# Patient Record
Sex: FEMALE | Race: WHITE
[De-identification: names, ages, dates, MRNs, and addresses within clinical notes are randomized per-mention and may not be internally consistent; named-entity substitution may affect disease eponyms.]

---

## 2017-07-08 ENCOUNTER — HOSPITAL ENCOUNTER (OUTPATIENT)
Dept: HOSPITAL 10 - L-D | Age: 32
Discharge: HOME | End: 2017-07-08
Attending: OBSTETRICS & GYNECOLOGY
Payer: MEDICAID

## 2017-07-08 VITALS — DIASTOLIC BLOOD PRESSURE: 54 MMHG | HEART RATE: 94 BPM | SYSTOLIC BLOOD PRESSURE: 97 MMHG | RESPIRATION RATE: 20 BRPM

## 2017-07-08 VITALS
BODY MASS INDEX: 30.87 KG/M2 | HEIGHT: 55 IN | WEIGHT: 133.38 LBS | WEIGHT: 133.38 LBS | BODY MASS INDEX: 30.87 KG/M2 | HEIGHT: 55 IN

## 2017-07-08 DIAGNOSIS — Z3A.34: ICD-10-CM

## 2017-07-08 LAB
ADD SCAN DIFF: NO
BASOPHILS # BLD AUTO: 0 10^3/UL (ref 0–0.1)
BASOPHILS NFR BLD: 0.3 % (ref 0–2)
EOSINOPHIL # BLD: 0.2 10^3/UL (ref 0–0.5)
EOSINOPHIL NFR BLD: 2 % (ref 0–7)
ERYTHROCYTE [DISTWIDTH] IN BLOOD BY AUTOMATED COUNT: 12.7 % (ref 11.5–14.5)
HCT VFR BLD CALC: 32 % (ref 37–47)
HGB BLD-MCNC: 11.2 G/DL (ref 12–16)
LYMPHOCYTES # BLD AUTO: 2.4 10^3/UL (ref 0.8–2.9)
LYMPHOCYTES NFR BLD AUTO: 23.3 % (ref 15–51)
MCH RBC QN AUTO: 31.6 PG (ref 29–33)
MCHC RBC AUTO-ENTMCNC: 35 G/DL (ref 32–37)
MCV RBC AUTO: 90.4 FL (ref 82–101)
MONOCYTES # BLD: 0.6 10^3/UL (ref 0.3–0.9)
MONOCYTES NFR BLD: 6.1 % (ref 0–11)
NEUTROPHILS # BLD: 6.9 10^3/UL (ref 1.6–7.5)
NEUTROPHILS NFR BLD AUTO: 67.7 % (ref 39–77)
NRBC # BLD MANUAL: 0 10^3/UL (ref 0–0)
NRBC BLD QL: 0 /100WBC (ref 0–0)
PLATELET # BLD: 282 10^3/UL (ref 140–415)
PMV BLD AUTO: 10.6 FL (ref 7.4–10.4)
RBC # BLD AUTO: 3.54 10^6/UL (ref 4.2–5.4)
WBC # BLD AUTO: 10.2 10^3/UL (ref 4.8–10.8)

## 2017-07-08 PROCEDURE — G0463 HOSPITAL OUTPT CLINIC VISIT: HCPCS

## 2017-07-08 PROCEDURE — 76818 FETAL BIOPHYS PROFILE W/NST: CPT

## 2017-07-08 PROCEDURE — 86900 BLOOD TYPING SEROLOGIC ABO: CPT

## 2017-07-08 PROCEDURE — 85025 COMPLETE CBC W/AUTO DIFF WBC: CPT

## 2017-07-08 PROCEDURE — 86850 RBC ANTIBODY SCREEN: CPT

## 2017-07-08 PROCEDURE — 86901 BLOOD TYPING SEROLOGIC RH(D): CPT

## 2017-07-08 PROCEDURE — 85460 HEMOGLOBIN FETAL: CPT
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
## 2017-07-08 NOTE — HP
Date/Time of Note


Date/Time of Note


DATE: 17 


TIME: 16:35





OB - History


Hx of Present Pregnancy


Free Text/Dictation


34+


:  1


Para:  0


Prenatal Care:  Good Care


Ultrasounds:  Normal mid trimester US


Obstetrical Complications:  None


Medical Complications:  None





Past Family/Social History


*


Past Medical, Surgical, Family and Obstetric Histories reviewed from prenatal 

chart.





OB  Admission Exam


Vital Signs


Vital Signs





 Vital Signs








  Date Time  Temp Pulse Resp B/P Pulse Ox O2 Delivery O2 Flow Rate FiO2


 


17 15:48 98.6 94 20 97/54 97 Room Air  











Physical Exam


Abdomen:  WNL


Cervical Dilatation:  None


Effacement:  0%


Station:  Ballotable


Membranes:  Intact


Fetal Heart Rate:  140's


Accelerations:  Accelerations Present


Decelerations:  No Decelerations


Varibility:  Marked


Contractions on Admission:  >10 Minutes Apart





OB  Assessment/Plan


Reason for admission:  observation


Plan:  Expectant Management


Other plan:


24 hr observation


IV HydrationType and screen


KB ,CBC 


BPP placenta


Continuous monitoring











DANNI MEJIAS M.D. 2017 16:37

## 2017-08-05 ENCOUNTER — HOSPITAL ENCOUNTER (OUTPATIENT)
Dept: HOSPITAL 10 - OBT | Age: 32
Discharge: HOME | End: 2017-08-05
Attending: OBSTETRICS & GYNECOLOGY
Payer: MEDICAID

## 2017-08-05 VITALS — DIASTOLIC BLOOD PRESSURE: 63 MMHG | HEART RATE: 69 BPM | SYSTOLIC BLOOD PRESSURE: 100 MMHG | RESPIRATION RATE: 18 BRPM

## 2017-08-05 VITALS
BODY MASS INDEX: 28.51 KG/M2 | WEIGHT: 135.8 LBS | HEIGHT: 58 IN | HEIGHT: 58 IN | WEIGHT: 135.8 LBS | BODY MASS INDEX: 28.51 KG/M2

## 2017-08-05 DIAGNOSIS — O47.1: Primary | ICD-10-CM

## 2017-08-05 DIAGNOSIS — Z3A.38: ICD-10-CM

## 2017-08-05 PROCEDURE — G0463 HOSPITAL OUTPT CLINIC VISIT: HCPCS

## 2017-08-05 NOTE — CONS
Date/Time of Note


Date/Time of Note


DATE: 17 


TIME: 09:16





Consultation Date/Type/Reason


Admit Date/Time


2017


OB triage consult


Reason for Consultation


This patient is 32 years old  1 para 0 with estimated date of 

confinement of 2017 which makes her 38 weeks and 5 days.


She came to triage complaining of contractions since early in the morning..


On examination she is a well-developed well-nourished pregnant woman in her 

late pregnancy. 


 Her general vital signs are basically normal with blood pressure of 106/63, 

pulse rate 69, respiration 18 temperature 97.8,


On examination of the abdomen ,she does not have much of a contraction, fundus 

is soft ,fetus in vertex presentation 


.  Fetal heart tone is normal fetal heart tracing is reactive with good 

variability occasional acceleration no deceleration.


Constitutional:  No chills, No diaphoresis, No disoriented, No febrile, No 

improved, No no complaints, No other, No poor po, No requiring IVF, No 

requiring O2


Eyes:  No discharge, No no complaints, No other, No pain, No redness, No visual 

change


ENT:  No bleeding, No congestion, No discharge, No dysphagia, No no complaints, 

No other, No pain, No sore throat


Respiratory:  No cough, No no complaints, No other, No pain, No pleuritic pain, 

No shortness of breath, No sputum, No wheezing


Cardiovascular:  No chest pain, No edema, No lightheadedness, No no complaints, 

No orthopenea, No other, No palpitations, No paroxysmal nocturnal dyspnea


Gastrointestinal:  other (On pelvic examination there was no bleeding slight 

brown discharge the cervix was 2 cm 80% effaced head at -2 station membrane was 

intact), 


   No blood, No constipation, No decreased appetite, No diarrhea, No flatus, No 

nausea, No no complaints, No pain, No passing stool, No vomiting


Genitourinary:  No bleeding, No discharge, No dysuria, No flank pain, No 

hematuria, No no complaints, No other


Musculoskeletal:  No back pain, No bone/joint pain, No neck pain, No no 

complaints, No other, No restricted range of motion, No swelling


Skin:  No bruising, No erythema, No laceration, No no complaints, No other, No 

pruritis, No rash, No skin lesions


Neurologic:  No confusion, No dizziness, No focal-weakness, No headache, No no 

complaints, No other, No seizure, No syncope


Endocrine:  No dry skin, No no complaints, No other, No polydypsia, No polyuria

, No temp intolerance


Additional Comments


Patient was kept in the hospital for about 2 hours and pelvic exam was repeated 

after 2 hours with the same finding of 2 cm 80% and -2 station.  


Disposition: patient was informed that she is not in labor or at least not in 

active labor and she will be discharged home with instruction to rest at home 

and return in the delivery room in case of further contractions ,vaginal 

bleeding or rupture of membrane 


.  Otherwise she will attend her obstetrician's office for continuation of her 

care. 


 Final diagnosis of false labor





Social History


Smoking Status:  Never smoker





Exam/Review of Systems


Vital Signs


Vitals





 Vital Signs








  Date Time  Temp Pulse Resp B/P Pulse Ox O2 Delivery O2 Flow Rate FiO2


 


17 06:40 97.8 69 18 100/63  Room Air  

















FELICITY BOOTH MD Aug 5, 2017 09:25

## 2017-08-06 ENCOUNTER — HOSPITAL ENCOUNTER (INPATIENT)
Dept: HOSPITAL 10 - OBT | Age: 32
LOS: 2 days | Discharge: HOME | End: 2017-08-08
Attending: OBSTETRICS & GYNECOLOGY | Admitting: OBSTETRICS & GYNECOLOGY
Payer: MEDICAID

## 2017-08-06 VITALS — RESPIRATION RATE: 16 BRPM | DIASTOLIC BLOOD PRESSURE: 61 MMHG | SYSTOLIC BLOOD PRESSURE: 97 MMHG | HEART RATE: 86 BPM

## 2017-08-06 VITALS
HEIGHT: 56 IN | WEIGHT: 134.26 LBS | BODY MASS INDEX: 30.2 KG/M2 | WEIGHT: 134.26 LBS | HEIGHT: 56 IN | BODY MASS INDEX: 30.2 KG/M2

## 2017-08-06 VITALS — RESPIRATION RATE: 17 BRPM | DIASTOLIC BLOOD PRESSURE: 60 MMHG | SYSTOLIC BLOOD PRESSURE: 101 MMHG | HEART RATE: 91 BPM

## 2017-08-06 VITALS — RESPIRATION RATE: 16 BRPM | DIASTOLIC BLOOD PRESSURE: 63 MMHG | HEART RATE: 68 BPM | SYSTOLIC BLOOD PRESSURE: 95 MMHG

## 2017-08-06 VITALS — SYSTOLIC BLOOD PRESSURE: 114 MMHG | RESPIRATION RATE: 18 BRPM | DIASTOLIC BLOOD PRESSURE: 68 MMHG | HEART RATE: 78 BPM

## 2017-08-06 DIAGNOSIS — Z3A.38: ICD-10-CM

## 2017-08-06 LAB
ADD UMIC: YES
APTT BLD: 26.6 SEC (ref 25–35)
BACTERIA #/AREA URNS HPF: (no result) /HPF
BASOPHILS # BLD AUTO: 0 10^3/UL (ref 0–0.1)
BASOPHILS NFR BLD: 0.2 % (ref 0–2)
COLOR UR: YELLOW
EOSINOPHIL # BLD: 0.2 10^3/UL (ref 0–0.5)
EOSINOPHIL NFR BLD: 1 % (ref 0–7)
ERYTHROCYTE [DISTWIDTH] IN BLOOD BY AUTOMATED COUNT: 12.8 % (ref 11.5–14.5)
GLUCOSE UR STRIP-MCNC: NEGATIVE MG/DL
HCT VFR BLD CALC: 34.9 % (ref 37–47)
HGB BLD-MCNC: 11.7 G/DL (ref 12–16)
INR PPP: 0.86
KETONES UR STRIP.AUTO-MCNC: NEGATIVE MG/DL
LYMPHOCYTES # BLD AUTO: 2.3 10^3/UL (ref 0.8–2.9)
LYMPHOCYTES NFR BLD AUTO: 14.7 % (ref 15–51)
MCH RBC QN AUTO: 29.5 PG (ref 29–33)
MCHC RBC AUTO-ENTMCNC: 33.5 G/DL (ref 32–37)
MCV RBC AUTO: 87.9 FL (ref 82–101)
MONOCYTES # BLD: 0.9 10^3/UL (ref 0.3–0.9)
MONOCYTES NFR BLD: 5.8 % (ref 0–11)
NEUTROPHILS # BLD: 12.1 10^3/UL (ref 1.6–7.5)
NEUTROPHILS NFR BLD AUTO: 77.3 % (ref 39–77)
NITRITE UR QL STRIP.AUTO: NEGATIVE MG/DL
NRBC # BLD MANUAL: 0 10^3/UL (ref 0–0)
NRBC BLD AUTO-RTO: 0 /100WBC (ref 0–0)
PLATELET # BLD: 301 10^3/UL (ref 140–415)
PMV BLD AUTO: 12.2 FL (ref 7.4–10.4)
PROTHROMBIN TIME: 11.7 SEC (ref 12.2–14.2)
PT RATIO: 0.9
RBC # BLD AUTO: 3.97 10^6/UL (ref 4.2–5.4)
RBC # UR AUTO: (no result) MG/DL
SQUAMOUS #/AREA URNS HPF: (no result) /HPF
UR ASCORBIC ACID: NEGATIVE MG/DL
UR BILIRUBIN (DIP): NEGATIVE MG/DL
UR CLARITY: CLEAR
UR PH (DIP): 6 (ref 5–9)
UR RBC: 3 /HPF (ref 0–5)
UR SPECIFIC GRAVITY (DIP): 1.02 (ref 1–1.03)
UR TOTAL PROTEIN (DIP): NEGATIVE MG/DL
UROBILINOGEN UR STRIP-ACNC: NEGATIVE MG/DL
WBC # BLD AUTO: 15.6 10^3/UL (ref 4.8–10.8)
WBC # UR STRIP: (no result) LEU/UL

## 2017-08-06 PROCEDURE — 86901 BLOOD TYPING SEROLOGIC RH(D): CPT

## 2017-08-06 PROCEDURE — 86900 BLOOD TYPING SEROLOGIC ABO: CPT

## 2017-08-06 PROCEDURE — 85025 COMPLETE CBC W/AUTO DIFF WBC: CPT

## 2017-08-06 PROCEDURE — 85730 THROMBOPLASTIN TIME PARTIAL: CPT

## 2017-08-06 PROCEDURE — 85610 PROTHROMBIN TIME: CPT

## 2017-08-06 PROCEDURE — 86592 SYPHILIS TEST NON-TREP QUAL: CPT

## 2017-08-06 PROCEDURE — G0463 HOSPITAL OUTPT CLINIC VISIT: HCPCS

## 2017-08-06 PROCEDURE — 4A1HX4Z MONITORING OF PRODUCTS OF CONCEPTION, CARDIAC ELECTRICAL ACTIVITY, EXTERNAL APPROACH: ICD-10-PCS | Performed by: OBSTETRICS & GYNECOLOGY

## 2017-08-06 PROCEDURE — 81001 URINALYSIS AUTO W/SCOPE: CPT

## 2017-08-06 PROCEDURE — 0HQ9XZZ REPAIR PERINEUM SKIN, EXTERNAL APPROACH: ICD-10-PCS | Performed by: OBSTETRICS & GYNECOLOGY

## 2017-08-06 RX ADMIN — Medication SCH MLS/HR: at 14:05

## 2017-08-06 RX ADMIN — IBUPROFEN SCH MG: 600 TABLET ORAL at 17:28

## 2017-08-06 RX ADMIN — DOCUSATE SODIUM AND SENNOSIDES SCH TAB: 8.6; 5 TABLET, FILM COATED ORAL at 21:09

## 2017-08-06 RX ADMIN — Medication SCH MLS/HR: at 18:03

## 2017-08-06 NOTE — LDN
Date/Time of Note


Date/Time of Note


DATE: 8/6/17 


TIME: 12:36





Delivery Summary


Normal spontaneous vaginal delivery of a baby boy from OA position shoulders 

delivered without any difficulty rest of the baby's body follow placenta is 

spontaneous expulsion inspected complete patient sustained 1 cm vaginal 

laceration repaired with 4-0 chromic catgut estimated blood loss 250 mL


Weeks of Gestation


38 weeks and 6 days


Placenta Delivered:  Spontaneously


Meconium:  none


Episiotomy:  No


Laceration repair:  


Small first-degree


perineal laceration 1 cm


repaired with 4-0


chromic catgut


Anesthesia type:  Local


Estimated blood loss:  25


Sponge & Needle done & correct:  Yes


All needle counts correct:  Yes


Any foreign bodies felt in the:  No


Problems:  





Infant Delivery Information


Sex


Infant Sex:  male





Apgars


1 Minute:  7


5 Minute:  9





Suctioning


Nose & mouth suctioned at kenzie:  Yes


Delee suction performed:  No





Umbilical Cord


Umbilical cord with:  3 Vessels


Cord presentations:  nuchal cord


Nuchal cord present X:  1


Cord Blood was obtained:  Yes











WHITLEY DORSEY MD Aug 6, 2017 12:40

## 2017-08-06 NOTE — TRIAGE
===================================

OB Triage

===================================

Datetime Report Generated by CPN: 08/06/2017 03:43

   

   

===========================

Datetime: 08/06/2017 03:28

===========================

   

   

===================================

Pain Assessment

===================================

   

 Pain Scale:  10

 Pain Presence:  Intermittent

 Pain Type:  Contraction

 Pain Location:  Abdomen; Back

 Pain Goal:  2

 Pain Relief Measures:  Comfort Measures

   

===========================

Datetime: 08/06/2017 02:55

===========================

   

 Assessment Type:  Admission Assessment

 Vaginal Bleeding:  Normal Show

   

===================================

Maternal Assessment

===================================

   

 Level of Consciousness:  Fully Conscious

 DTR's/Clonus:  DTRs 2+; No Clonus

 Headache:  Denies

 Blurred Vision:  No

 Respiratory Effort:  Unlabored; Regular Rhythm; Equal Expansion

 Breath Sounds, Left:  Clear and Equal

 Breath Sounds, Right:  Clear and Equal

 Nausea/Vomiting:  Denies

 RUQ Epigastric Pain:  Denies

 Lower Extremities Edema:  None

     Degree:  None

 Upper Extremities Edema:  None

     Degree:  None

 Facial Edema:  None

   

===================================

Fall Risk Assessment

===================================

   

 History of Falling:  (0) No

 Secondary Diagnosis:  (0) No

 Ambulatory Aid:  (0) Bedrest/Nurse Assist

 IV Therapy:  (0) No

 Gait:  (0) Normal/Bedrest/Immobile

 Mental Status:  (0) Oriented to Own Ability

 Fall Score:  0

 Fall Risk Score Definition:  No Risk: No action required

   

===================================

Pain Assessment

===================================

   

 Pain Scale:  10

 Pain Presence:  Intermittent

 Pain Type:  Contraction

 Pain Location:  Abdomen; Back

 Pain Goal:  2

 Membrane Status:  Bulging

   

===========================

Datetime: 08/06/2017 02:20

===========================

   

 Stage of Pregnancy:  OB Triage

   

===================================

Labor Evaluation

===================================

   

 Frequency:  2-3

 Monitor Mode:  External

 Duration (sec)2399:  

 Quality:  Moderate

 Pattern:  Normal: <= 5 Contractions in 10 Minutes

   

===================================

Fetal Heart Rate

===================================

   

 FHR Baseline Rate:  140

 Monitor Mode:  External US

 FHR Baseline Changes:  No Baseline Change

 Variability:  Moderate 6-25 bpm

 Accelerations:  15X15

 Decelerations:  None

 Category:  Category I

   

===========================

Datetime: 08/06/2017 02:15

===========================

   

 Stage of Pregnancy:  OB Triage

   

===========================

Datetime: 08/06/2017 01:55

===========================

   

   

===================================

Vaginal Exam

===================================

   

 Dilatation (cms):  2.0

 Effacement (%):  100

 Station:  -2

 Exam By:  HANNA Larsen RN

 Vaginal Bleeding:  Normal Show

 Cervix, Consistency:  Soft

 Cervix, Position:  Anterior

   

===========================

Datetime: 08/06/2017 01:51

===========================

   

 Assessment Type:  Triage

   

===================================

Maternal Assessment

===================================

   

 Level of Consciousness:  Fully Conscious

 DTR's/Clonus:  DTRs 2+; No Clonus

 Headache:  Denies

 Blurred Vision:  No

 Respiratory Effort:  Unlabored; Regular Rhythm; Equal Expansion

 Breath Sounds, Left:  Clear and Equal

 Breath Sounds, Right:  Clear and Equal

 Nausea/Vomiting:  Denies

 RUQ Epigastric Pain:  Denies

 Lower Extremities Edema:  None

     Degree:  None

 Upper Extremities Edema:  None

     Degree:  None

 Facial Edema:  None

   

===================================

Fall Risk Assessment

===================================

   

 History of Falling:  (0) No

 Secondary Diagnosis:  (0) No

 Ambulatory Aid:  (0) Bedrest/Nurse Assist

 IV Therapy:  (0) No

 Gait:  (0) Normal/Bedrest/Immobile

 Mental Status:  (0) Oriented to Own Ability

 Fall Score:  0

 Fall Risk Score Definition:  No Risk: No action required

   

===================================

Pain Assessment

===================================

   

 Pain Scale:  9

 Pain Presence:  Intermittent

 Pain Type:  Cramping; Contraction

 Pain Location:  Abdomen

 Pain Goal:  3

 Pain Relief Measures:  Comfort Measures

 Pain Assessment Comments:  HANNA Larsen RN

   

===========================

Datetime: 08/06/2017 01:38

===========================

   

 Time of Arrival:  08/06/2017 01:23

 EGA:  38.6

 Arrived By:  Wheelchair

 Arrived From:  Home

 Chief Complaint:  UC's X2 days (Annotations: Data stored by N on behalf of user)

 Fetal Movement:  Present

 Contractions:  Regular

 Time Contractions Began:  08/04/2017 09:00

 Patient Complaints:  Contractions

 Time Provider Notified:  08/06/2017 02:15

 Provider Notified:  Dr Brady

 Initial Plan:  EFM X2, SVE

   

===========================

Datetime: 08/05/2017 09:06

===========================

   

   

===================================

Labor Evaluation

===================================

   

 Frequency:  2-5

 Monitor Mode:  External

 Duration (sec)2399:  

 Quality:  Mild

 Pattern:  Normal: <= 5 Contractions in 10 Minutes

 Resting Tone [place]:  Relaxed

   

===================================

Fetal Heart Rate

===================================

   

 FHR Baseline Rate:  135

 Monitor Mode:  External US

 Variability:  Moderate 6-25 bpm

 Accelerations:  15X15

 Decelerations:  None

 Category:  Category I

   

===========================

Datetime: 08/05/2017 09:04

===========================

   

   

===================================

Vaginal Exam

===================================

   

 Dilatation (cms):  2.0

 Effacement (%):  90

 Station:  -3

 Exam By:  ogbodu rn 

   

===========================

Datetime: 08/05/2017 08:40

===========================

   

   

===================================

Maternal Assessment

===================================

   

 Level of Consciousness:  Fully Conscious

 DTR's/Clonus:  DTRs 2+; No Clonus

 Headache:  Denies

 Blurred Vision:  No

 Respiratory Effort:  Unlabored; Regular Rhythm; Equal Expansion

 Breath Sounds, Left:  Clear and Equal

 Breath Sounds, Right:  Clear and Equal

 Nausea/Vomiting:  Denies

 RUQ Epigastric Pain:  Denies

 Lower Extremities Edema:  Bilateral Lower Extremities

     Degree:  None

 Upper Extremities Edema:  None

     Degree:  None

 Facial Edema:  None

   

===================================

Fall Risk Assessment

===================================

   

 History of Falling:  (0) No

 Secondary Diagnosis:  (0) No

 Ambulatory Aid:  (0) Bedrest/Nurse Assist

 IV Therapy:  (0) No

 Gait:  (0) Normal/Bedrest/Immobile

 Mental Status:  (0) Oriented to Own Ability

 Fall Score:  0

 Fall Risk Score Definition:  No Risk: No action required

   

===========================

Datetime: 08/05/2017 08:04

===========================

   

   

===================================

Labor Evaluation

===================================

   

 Frequency:  3-5

 Monitor Mode:  External

 Duration (sec)2399:  

 Quality:  Moderate

 Pattern:  Normal: <= 5 Contractions in 10 Minutes

 Resting Tone [place]:  Relaxed

   

===================================

Fetal Heart Rate

===================================

   

 FHR Baseline Rate:  145

 Monitor Mode:  External US

 Variability:  Moderate 6-25 bpm

 Accelerations:  15X15

 Decelerations:  None

 Category:  Category I

 Comments:  nst reactive for gestational age 

   

===========================

Datetime: 08/05/2017 07:00

===========================

   

   

===================================

Labor Evaluation

===================================

   

 Frequency:  2-6

 Monitor Mode:  External

 Duration (sec)2399:  

 Quality:  Mild

 Pattern:  Normal: <= 5 Contractions in 10 Minutes

 Resting Tone [place]:  Relaxed

   

===================================

Fetal Heart Rate

===================================

   

 FHR Baseline Rate:  135

 Monitor Mode:  External US

 Variability:  Moderate 6-25 bpm

 Accelerations:  15X15

 Decelerations:  None

 Category:  Category I

   

===========================

Datetime: 08/05/2017 06:53

===========================

   

   

===================================

Vaginal Exam

===================================

   

 Dilatation (cms):  2.0

 Effacement (%):  80

 Station:  -2

 Exam By:  BE

 Membrane Status:  Bulging

 Vaginal Bleeding:  None

 Cervix, Consistency:  Soft

 Cervix, Position:  Midposition

 Fetal Presentation 'A':  Cephalic

   

===========================

Datetime: 08/05/2017 06:37

===========================

   

 Time of Arrival:  08/05/2017 06:15

 EGA:  38.5

 Arrived By:  Ambulatory

 Arrived From:  Home

 Chief Complaint:  CONTRACTIONS 

 Fetal Movement:  Present

 Contractions:  Regular

 Time Contractions Began:  08/05/2017 00:00

 Contractions:  Q5MIN

 Rupture of Membranes:  Denies

 Vaginal Bleeding:  None

 Vaginal Discharge:  Denies

 Recent Sexual Intercouse:  Denies

 Abdominal Trauma:  Not Applicable

 Patient Complaints:  Contractions

 Time Provider Notified:  08/05/2017 07:05

 Provider Notified:  JHON

 Initial Plan:  CEFM, SVE

   

===========================

Datetime: 08/05/2017 06:36

===========================

   

 Stage of Pregnancy:  OB Triage

 Temperature Route:  Oral

   

===================================

Pain Assessment

===================================

   

 Pain Scale:  6

 Pain Presence:  Intermittent

 Pain Type:  Cramping; Contraction

 Pain Location:  Abdomen

   

===========================

Datetime: 08/05/2017 06:34

===========================

   

 Assessment Type:  Triage

   

===================================

Maternal Assessment

===================================

   

 Level of Consciousness:  Fully Conscious

 DTR's/Clonus:  DTRs 2+; No Clonus

 Headache:  Denies

 Blurred Vision:  No

 Respiratory Effort:  Unlabored; Regular Rhythm; Equal Expansion

 Breath Sounds, Left:  Clear and Equal

 Breath Sounds, Right:  Clear and Equal

 Nausea/Vomiting:  Denies

 RUQ Epigastric Pain:  Denies

 Lower Extremities Edema:  None

     Degree:  None

 Upper Extremities Edema:  None

     Degree:  None

 Facial Edema:  None

   

===================================

Fall Risk Assessment

===================================

   

 History of Falling:  (0) No

 Secondary Diagnosis:  (0) No

 Ambulatory Aid:  (0) Bedrest/Nurse Assist

 IV Therapy:  (0) No

 Gait:  (0) Normal/Bedrest/Immobile

 Mental Status:  (0) Oriented to Own Ability

 Fall Score:  0

 Fall Risk Score Definition:  No Risk: No action required

   

===========================

Datetime: 07/08/2017 16:26

===========================

   

 EGA:  34.5

## 2017-08-06 NOTE — HP
Date/Time of Note


Date/Time of Note


DATE: 17 


TIME: 02:37





OB - History


Hx of Present Pregnancy


Free Text/Dictation


32y.o primigravida at 38w6d came to triage with  c/o uterine contractions for  

2days with intact membrane.


VE 2cm 100% -2 with bulging  bag


 pain level  is 9/10 


EFM shows U.C 2-4min  with reactive strips


prenatal record is not available.


admitted for expectant management,


Chief Complaint:  U.C


Estimated Due Date:  Aug 5, 2017


:  2


Para:  1


Spontaneous :  0


Therapeutic :  0


Prenatal Care:  Other


Ultrasounds:  Other


Obstetrical Complications:  None


Medical Complications:  None





Past Family/Social History


*


Past Medical, Surgical, Family and Obstetric Histories reviewed from prenatal 

chart.


Blood Type:  Unknown


Rubella:  unknown


RPR/VDRL:  Unknown


GBS Status:  Unknown


HBsAG:  Unknown





OB  Admission Exam


Vital Signs


Vital Signs





 Vital Signs








  Date Time  Temp Pulse Resp B/P Pulse Ox O2 Delivery O2 Flow Rate FiO2


 


17 02:05 98.6 78 18 114/68  Room Air  











Physical Exam


HEENT:  WNL


Heart:  Rhythm Normal


Lungs:  Clear, Equal


Abdomen:  WNL


Extremities:  Normal


Reflexes:  Normal


Cervical Dilatation:  2cm


Effacement:  75%


Station:  -3


Membranes:  Intact


Amniotic Fluid:  Unevaluable


Fetal Heart Rate:  140's


Accelerations:  Accelerations Present


Decelerations:  No Decelerations


Varibility:  Moderate


Contractions on Admission:  < 5 Minutes Apart


Intensity:  Mild





OB  Assessment/Plan


Other Assessment:


IUP 38w6d in labor


Plan:  Expectant Management











MORENITA FERREIRA MD Aug 6, 2017 02:45

## 2017-08-07 VITALS — HEART RATE: 79 BPM | DIASTOLIC BLOOD PRESSURE: 55 MMHG | SYSTOLIC BLOOD PRESSURE: 96 MMHG | RESPIRATION RATE: 18 BRPM

## 2017-08-07 VITALS — DIASTOLIC BLOOD PRESSURE: 58 MMHG | HEART RATE: 74 BPM | RESPIRATION RATE: 17 BRPM | SYSTOLIC BLOOD PRESSURE: 105 MMHG

## 2017-08-07 VITALS — RESPIRATION RATE: 17 BRPM | HEART RATE: 64 BPM | DIASTOLIC BLOOD PRESSURE: 64 MMHG | SYSTOLIC BLOOD PRESSURE: 102 MMHG

## 2017-08-07 VITALS — SYSTOLIC BLOOD PRESSURE: 97 MMHG | RESPIRATION RATE: 18 BRPM | DIASTOLIC BLOOD PRESSURE: 53 MMHG | HEART RATE: 98 BPM

## 2017-08-07 VITALS — DIASTOLIC BLOOD PRESSURE: 50 MMHG | HEART RATE: 86 BPM | RESPIRATION RATE: 18 BRPM | SYSTOLIC BLOOD PRESSURE: 106 MMHG

## 2017-08-07 LAB
BASOPHILS # BLD AUTO: 0 10^3/UL (ref 0–0.1)
BASOPHILS NFR BLD: 0.3 % (ref 0–2)
EOSINOPHIL # BLD: 0.2 10^3/UL (ref 0–0.5)
EOSINOPHIL NFR BLD: 1.4 % (ref 0–7)
ERYTHROCYTE [DISTWIDTH] IN BLOOD BY AUTOMATED COUNT: 13.1 % (ref 11.5–14.5)
HCT VFR BLD CALC: 29.2 % (ref 37–47)
HGB BLD-MCNC: 9.6 G/DL (ref 12–16)
LYMPHOCYTES # BLD AUTO: 2.7 10^3/UL (ref 0.8–2.9)
LYMPHOCYTES NFR BLD AUTO: 19.6 % (ref 15–51)
MCH RBC QN AUTO: 29.5 PG (ref 29–33)
MCHC RBC AUTO-ENTMCNC: 32.9 G/DL (ref 32–37)
MCV RBC AUTO: 89.8 FL (ref 82–101)
MONOCYTES # BLD: 0.9 10^3/UL (ref 0.3–0.9)
MONOCYTES NFR BLD: 6.8 % (ref 0–11)
NEUTROPHILS # BLD: 9.9 10^3/UL (ref 1.6–7.5)
NEUTROPHILS NFR BLD AUTO: 71.2 % (ref 39–77)
NRBC # BLD MANUAL: 0 10^3/UL (ref 0–0)
NRBC BLD AUTO-RTO: 0 /100WBC (ref 0–0)
PLATELET # BLD: 247 10^3/UL (ref 140–415)
PMV BLD AUTO: 11.2 FL (ref 7.4–10.4)
RBC # BLD AUTO: 3.25 10^6/UL (ref 4.2–5.4)
WBC # BLD AUTO: 13.9 10^3/UL (ref 4.8–10.8)

## 2017-08-07 RX ADMIN — DOCUSATE SODIUM AND SENNOSIDES SCH TAB: 8.6; 5 TABLET, FILM COATED ORAL at 21:00

## 2017-08-07 RX ADMIN — IBUPROFEN SCH MG: 600 TABLET ORAL at 00:00

## 2017-08-07 RX ADMIN — IBUPROFEN SCH MG: 600 TABLET ORAL at 05:49

## 2017-08-07 RX ADMIN — DOCUSATE SODIUM AND SENNOSIDES SCH TAB: 8.6; 5 TABLET, FILM COATED ORAL at 09:00

## 2017-08-07 RX ADMIN — IBUPROFEN SCH MG: 600 TABLET ORAL at 12:00

## 2017-08-07 RX ADMIN — IBUPROFEN SCH MG: 600 TABLET ORAL at 18:00

## 2017-08-07 NOTE — PN
Date/Time of Note


Date/Time of Note


DATE: 8/7/17 


TIME: 08:49





OB Subjective


Subjective


Subjective





 Laboratory Tests








Test


  8/7/17


07:04


 


White Blood Count 13.910^3/ul 


 


Red Blood Count 3.2510^6/ul 


 


Hemoglobin 9.6g/dl 


 


Hematocrit 29.2% 


 


Mean Corpuscular Volume 89.8fl 


 


Mean Corpuscular Hemoglobin 29.5pg 


 


Mean Corpuscular Hemoglobin


Concent 32.9g/dl 


 


 


Red Cell Distribution Width 13.1% 


 


Platelet Count 33061^3/UL 


 


Mean Platelet Volume 11.2fl 


 


Neutrophils % 71.2% 


 


Lymphocytes % 19.6% 


 


Monocytes % 6.8% 


 


Eosinophils % 1.4% 


 


Basophils % 0.3% 


 


Nucleated Red Blood Cells % 0.0/100WBC 


 


Neutrophils # 9.910^3/ul 


 


Lymphocytes # 2.710^3/ul 


 


Monocytes # 0.910^3/ul 


 


Eosinophils # 0.210^3/ul 


 


Basophils # 0.010^3/ul 


 


Nucleated Red Blood Cells # 0.010^3/ul 








 Current Medications








 Medications


  (Trade)  Dose


 Ordered  Sig/Stella


 Route


 PRN Reason  Start Time


 Stop Time Status Last Admin


Dose Admin


 


 Lactated Ringer's  1,000 ml @ 


 125 mls/hr  Q8H


 IV


   8/6/17 02:27


 8/6/17 14:07 DC 8/6/17 02:48


 


 


 Ampicillin  100 ml @ 


 100 mls/hr  ONCE  ONCE


 IV


   8/6/17 02:30


 8/6/17 03:29 DC 8/6/17 03:25


 


 


 Ampicillin


  (Ampicillin 1 Gm/


 NS (Pmx))  50 ml @ 


 100 mls/hr  Q4H


 IV


   8/6/17 06:30


 8/6/17 14:07 DC 8/6/17 07:43


 


 


 Butorphanol


 Tartrate


  (Stadol)  2 mg  Q2H  PRN


 IV


 PAIN  8/6/17 02:30


 8/6/17 14:08 DC 8/6/17 07:59


 


 


 Lidocaine 30 ml  30 ml  ONCE PRN


 INJ


 EPISIOTOMY/TEARING  8/6/17 02:30


 8/6/17 14:08 DC  


 


 


 Oxytocin/Lactated


 Ringer's  500 ml @ 


 125 mls/hr  ONCE -MAY REPEAT X1


 IV


   8/6/17 02:30


 8/6/17 14:08 DC  


 


 


 Oxytocin/Lactated


 Ringer's  500 ml @ 


 125 mls/hr  ONCE


 IV


   8/6/17 02:30


 8/6/17 14:08 DC 8/6/17 11:42


 


 


 Ibuprofen


  (Motrin)  600 mg  ONCE PRN


 PO


 Mild Pain (Pain Score 1-3)  8/6/17 02:30


 8/6/17 14:08 DC  


 


 


 Acetaminophen/


 Codeine Phosphate


 2 tab  2 tab  ONCE PRN


 PO


 Moderate to Severe Pain (4-10)  8/6/17 02:30


 8/6/17 14:08 DC  


 


 


 Lactated Ringer's  1,000 ml @ 


 2,000 mls/hr  Q30M PRN


 IV


 PRE-EPIDURAL BOLUS  8/6/17 03:00


 8/6/17 14:08 DC  


 


 


 Oxytocin/Lactated


 Ringer's  500 ml @ 0


 mls/hr  ONCE PRN


 IV


 For Hemorrhage Management  8/6/17 02:30


 8/6/17 14:08 DC  


 


 


 Methylergonovine


 Maleate


  (Methergine)  0.2 mg  ONCE PRN


 IM


 VAGINAL BLEEDING  8/6/17 02:30


 8/6/17 14:08 DC  


 


 


 Carboprost


 Tromethamine


  (Hemabate)  250 mcg  ONCE PRN


 IM


 VAGINAL BLEEDING  8/6/17 02:30


 8/6/17 14:08 DC  


 


 


 Misoprostol 1000


 mcg  1,000 mcg  ONCE PRN


 PA


 VAGINAL BLEEDING  8/6/17 02:30


 8/6/17 14:08 DC  


 


 


 Oxytocin/Lactated


 Ringer's  500 ml @ 0


 mls/hr  Q0M


 IV


   8/6/17 08:30


 8/6/17 14:08 DC 8/6/17 08:34


 


 


 Mineral Oil     ONCE  ONCE


 TOP


   8/6/17 11:00


 8/6/17 11:01 DC  


 


 


 Oxytocin/Lactated


 Ringer's  500 ml @ 


 125 mls/hr  Q4H


 IV


   8/6/17 14:05


 8/6/17 22:04 DC  


 


 


 Ibuprofen


  (Motrin)  600 mg  Q6


 PO


   8/6/17 18:00


    8/6/17 17:28


 


 


 Acetaminophen


  (Tylenol Tab)  650 mg  Q4H  PRN


 PO


 PAIN LEVEL 1-5  8/6/17 14:30


     


 


 


 Acetaminophen/


 Codeine Phosphate


  (Tylenol No.3)  1 tab  Q4H  PRN


 PO


 PAIN LEVEL 1-5  8/6/17 14:30


     


 


 


 Acetaminophen/


 Codeine Phosphate


  (Tylenol No.3)  2 tab  Q4H  PRN


 PO


 PAIN LEVEL 6-10  8/6/17 14:30


     


 


 


 Oxycodone/Aspirin


  (Percodan)  1 tab  Q3H  PRN


 PO


 PAIN LEVEL 1-5  8/6/17 14:30


     


 


 


 Oxycodone/Aspirin


  (Percodan)  2 tab  Q3H  PRN


 PO


 PAIN LEVEL 6-10  8/6/17 14:30


     


 


 


 Ondansetron HCl


  (Zofran Inj)  4 mg  Q6H  PRN


 IV


 NAUSEA AND/OR VOMITING  8/6/17 14:30


     


 


 


 Senna/Docusate


 Sodium


  (Senokot-S)  1 tab  BID


 PO


   8/6/17 21:00


    8/6/17 21:09


 


 


 Witch Hazel/


 Glycerin


  (Tucks Pads)  1 pad  BEDSIDE MEDICATION  PRN


 PA


 HEMORRHOID/EPISIOTMY PAIN  8/6/17 14:30


    8/6/17 17:27


 


 


 Benzocaine


  (Dermoplast


 Spray)  1 spray  BEDSIDE MEDICATION  PRN


 TOP


 HEMORRHOID/EPISIOTMY PAIN  8/6/17 14:30


    8/6/17 17:27


 


 


 Dibucaine


  (Nupercainal)  1 applic  BEDSIDE MEDICATION  PRN


 PA


 HEMORRHOID/EPISIOTMY PAIN  8/6/17 14:30


     


 


 


 Lanolin


  (Connor-O-Soothe)  1 applic  BEDSIDE MEDICATION  PRN


 TOP


 BEDSIDE FOR CAN TO NIPPLES  8/6/17 14:30


    8/6/17 17:27


 


 


 Measles/Mumps/


 Rubella Vaccine


 Live


  (Mmr Ii Vaccine)  0.5 ml  ONCE ONCE


 SC*


   8/8/17 09:00


 8/8/17 09:01   


 





Postpartum day 1


Afebrile vital signs are stable abdomen soft uterus firm, lochia normal 

extremity normal ambulation encouraged











WHITLEY DORSEY MD Aug 7, 2017 08:50

## 2017-08-08 VITALS — HEART RATE: 75 BPM | DIASTOLIC BLOOD PRESSURE: 60 MMHG | SYSTOLIC BLOOD PRESSURE: 108 MMHG | RESPIRATION RATE: 18 BRPM

## 2017-08-08 VITALS — HEART RATE: 78 BPM | RESPIRATION RATE: 17 BRPM | DIASTOLIC BLOOD PRESSURE: 65 MMHG | SYSTOLIC BLOOD PRESSURE: 100 MMHG

## 2017-08-08 RX ADMIN — IBUPROFEN SCH MG: 600 TABLET ORAL at 05:35

## 2017-08-08 RX ADMIN — IBUPROFEN SCH MG: 600 TABLET ORAL at 12:00

## 2017-08-08 RX ADMIN — DOCUSATE SODIUM AND SENNOSIDES SCH TAB: 8.6; 5 TABLET, FILM COATED ORAL at 09:00

## 2017-08-08 RX ADMIN — IBUPROFEN SCH MG: 600 TABLET ORAL at 00:00

## 2017-08-08 NOTE — DS
Date/Time of Note


Date/Time of Note


DATE: 8/8/17 


TIME: 10:06





Discharge Summary


Admission/Discharge Info


Admit Date/Time


Aug 6, 2017 at 02:30


Discharge Date/Time


August 8, 2017 at 10


Discharge Diagnosis


Normal vaginal delivery day 2


Patient Condition:  Good


Procedures


Normal vaginal delivery


Hx of Present Illness


Term pregnancy in labor


Hospital Course


Satisfactory uneventful


Home Meds


Reported Medications


Multivit/Min/Fol Ac/Iron/Pren* (Prenatal S*) 1 Tab Tab, 1 TAB PO DAILY, TAB


   7/8/17


Follow-up Plan


Postpartum instructions given recommended to make appointment to be seen at the 

clinic in 2 weeks


Primary Care Provider


Care Physician No Primary


Time spent on discharge:  < 30 minutes











WHITLEY DORSEY MD Aug 8, 2017 10:08

## 2017-08-08 NOTE — PD.PPDC
OB/GYN Discharge Instruction


Condition


Patient Condition:  Good





Diet


Diet:  Resume Regular Diet





Activity/Restrictions








Activity:   Normal Activity





 May Shower














Restrictions:   No Exercising





 No Lifting





 No Driving





 No Sexual Activity





 Nothing in the Vagina





 No Dales





 No Tampons, douche











Follow-up


Follow-up with Physician:  2, Week/Weeks


Provider Information:


Appointment clinic in 2 weeks for postpartum check





Return to clinic for








GYN Instructions:   Fever greater than 101





 Chills





 Worsening abdominal pain





 Excessive Vaginal Bleeding





 More than 2 pads per hour





 Unable to tolerate diet














OB Instructions:   Breast Tenderness





 Depression





 Blurried Vision





 Headache

















WHITLEY DORSEY MD Aug 8, 2017 10:04

## 2019-08-05 ENCOUNTER — HOSPITAL ENCOUNTER (INPATIENT)
Dept: HOSPITAL 10 - E/R | Age: 34
LOS: 1 days | Discharge: HOME | DRG: 872 | End: 2019-08-06
Payer: MEDICAID

## 2019-08-05 ENCOUNTER — HOSPITAL ENCOUNTER (INPATIENT)
Dept: HOSPITAL 91 - 6WM | Age: 34
LOS: 1 days | Discharge: HOME | DRG: 872 | End: 2019-08-06
Payer: MEDICAID

## 2019-08-05 VITALS
HEIGHT: 59 IN | BODY MASS INDEX: 27.51 KG/M2 | BODY MASS INDEX: 27.51 KG/M2 | WEIGHT: 136.47 LBS | HEIGHT: 59 IN | WEIGHT: 136.47 LBS

## 2019-08-05 DIAGNOSIS — A41.9: Primary | ICD-10-CM

## 2019-08-05 DIAGNOSIS — R65.20: ICD-10-CM

## 2019-08-05 DIAGNOSIS — E86.1: ICD-10-CM

## 2019-08-05 DIAGNOSIS — N30.00: ICD-10-CM

## 2019-08-05 DIAGNOSIS — N10: ICD-10-CM

## 2019-08-05 DIAGNOSIS — E66.9: ICD-10-CM

## 2019-08-05 DIAGNOSIS — E86.0: ICD-10-CM

## 2019-08-05 DIAGNOSIS — D64.9: ICD-10-CM

## 2019-08-05 LAB
ADD MAN DIFF?: NO
ADD UMIC: YES
ALANINE AMINOTRANSFERASE: 50 IU/L (ref 13–69)
ALBUMIN/GLOBULIN RATIO: 1.15
ALBUMIN: 4.5 G/DL (ref 3.3–4.9)
ALKALINE PHOSPHATASE: 124 IU/L (ref 42–121)
ANION GAP: 14 (ref 5–13)
ASPARTATE AMINO TRANSFERASE: 64 IU/L (ref 15–46)
BASOPHIL #: 0.1 10^3/UL (ref 0–0.1)
BASOPHILS %: 0.4 % (ref 0–2)
BILIRUBIN,DIRECT: 0 MG/DL (ref 0–0.2)
BILIRUBIN,TOTAL: 0.8 MG/DL (ref 0.2–1.3)
BLOOD UREA NITROGEN: 13 MG/DL (ref 7–20)
CALCIUM: 9.6 MG/DL (ref 8.4–10.2)
CARBON DIOXIDE: 20 MMOL/L (ref 21–31)
CHLORIDE: 106 MMOL/L (ref 97–110)
CREATININE: 0.67 MG/DL (ref 0.44–1)
EOSINOPHILS #: 0.1 10^3/UL (ref 0–0.5)
EOSINOPHILS %: 1.2 % (ref 0–7)
GLOBULIN: 3.9 G/DL (ref 1.3–3.2)
GLUCOSE: 145 MG/DL (ref 70–220)
HEMATOCRIT: 39.7 % (ref 37–47)
HEMOGLOBIN: 13.5 G/DL (ref 12–16)
IMMATURE GRANS #M: 0.07 10^3/UL (ref 0–0.03)
IMMATURE GRANS % (M): 0.6 % (ref 0–0.43)
INR: 0.95
LACTIC ACID: 1 MMOL/L (ref 0.5–2)
LACTIC ACID: 1.1 MMOL/L (ref 0.5–2)
LYMPHOCYTES #: 1.6 10^3/UL (ref 0.8–2.9)
LYMPHOCYTES %: 14.4 % (ref 15–51)
MEAN CORPUSCULAR HEMOGLOBIN: 29.9 PG (ref 29–33)
MEAN CORPUSCULAR HGB CONC: 34 G/DL (ref 32–37)
MEAN CORPUSCULAR VOLUME: 87.8 FL (ref 82–101)
MEAN PLATELET VOLUME: 10.2 FL (ref 7.4–10.4)
MONOCYTE #: 0.3 10^3/UL (ref 0.3–0.9)
MONOCYTES %: 2.5 % (ref 0–11)
NEUTROPHIL #: 9.2 10^3/UL (ref 1.6–7.5)
NEUTROPHILS %: 80.9 % (ref 39–77)
NUCLEATED RED BLOOD CELLS #: 0 10^3/UL (ref 0–0)
NUCLEATED RED BLOOD CELLS%: 0 /100WBC (ref 0–0)
PARTIAL THROMBOPLASTIN TIME: 26.1 SEC (ref 23–35)
PLATELET COUNT: 238 10^3/UL (ref 140–415)
POTASSIUM: 3.5 MMOL/L (ref 3.5–5.1)
PROCALCITONIN: 16.87 NG/ML (ref 0–0.1)
PROTIME: 12.8 SEC (ref 11.9–14.9)
PT RATIO: 1
RED BLOOD COUNT: 4.52 10^6/UL (ref 4.2–5.4)
RED CELL DISTRIBUTION WIDTH: 12.2 % (ref 11.5–14.5)
SODIUM: 140 MMOL/L (ref 135–144)
TOTAL PROTEIN: 8.4 G/DL (ref 6.1–8.1)
TROPONIN-I: < 0.012 NG/ML (ref 0–0.12)
UR ASCORBIC ACID: NEGATIVE MG/DL
UR BACTERIA: (no result) /HPF
UR BILIRUBIN (DIP): NEGATIVE MG/DL
UR BLOOD (DIP): (no result) MG/DL
UR CLARITY: (no result)
UR COLOR: YELLOW
UR GLUCOSE (DIP): NEGATIVE MG/DL
UR KETONES (DIP): NEGATIVE MG/DL
UR LEUKOCYTE ESTERASE (DIP): (no result) LEU/UL
UR MUCUS: (no result) /HPF
UR NITRITE (DIP): NEGATIVE MG/DL
UR PH (DIP): 6 (ref 5–9)
UR RBC: 4 /HPF (ref 0–5)
UR SPECIFIC GRAVITY (DIP): 1.01 (ref 1–1.03)
UR SQUAMOUS EPITHELIAL CELL: (no result) /HPF
UR TOTAL PROTEIN (DIP): (no result) MG/DL
UR UROBILINOGEN (DIP): NEGATIVE MG/DL
UR WBC: 58 /HPF (ref 0–5)
URINE BLOOD (DIP) POC: (no result)
URINE LEUKOCYTE EST (DIP) POC: (no result)
URINE PH (DIP) POC: 6.5 (ref 5–8.5)
URINE TOTAL PROTEIN POC: (no result)
WHITE BLOOD COUNT: 11.4 10^3/UL (ref 4.8–10.8)

## 2019-08-05 PROCEDURE — 87880 STREP A ASSAY W/OPTIC: CPT

## 2019-08-05 PROCEDURE — 93005 ELECTROCARDIOGRAM TRACING: CPT

## 2019-08-05 PROCEDURE — 81001 URINALYSIS AUTO W/SCOPE: CPT

## 2019-08-05 PROCEDURE — 80053 COMPREHEN METABOLIC PANEL: CPT

## 2019-08-05 PROCEDURE — 81003 URINALYSIS AUTO W/O SCOPE: CPT

## 2019-08-05 PROCEDURE — 83735 ASSAY OF MAGNESIUM: CPT

## 2019-08-05 PROCEDURE — 81025 URINE PREGNANCY TEST: CPT

## 2019-08-05 PROCEDURE — 71045 X-RAY EXAM CHEST 1 VIEW: CPT

## 2019-08-05 PROCEDURE — 85730 THROMBOPLASTIN TIME PARTIAL: CPT

## 2019-08-05 PROCEDURE — 99285 EMERGENCY DEPT VISIT HI MDM: CPT

## 2019-08-05 PROCEDURE — 84443 ASSAY THYROID STIM HORMONE: CPT

## 2019-08-05 PROCEDURE — 85025 COMPLETE CBC W/AUTO DIFF WBC: CPT

## 2019-08-05 PROCEDURE — 83036 HEMOGLOBIN GLYCOSYLATED A1C: CPT

## 2019-08-05 PROCEDURE — 84145 PROCALCITONIN (PCT): CPT

## 2019-08-05 PROCEDURE — 87086 URINE CULTURE/COLONY COUNT: CPT

## 2019-08-05 PROCEDURE — 83605 ASSAY OF LACTIC ACID: CPT

## 2019-08-05 PROCEDURE — 85610 PROTHROMBIN TIME: CPT

## 2019-08-05 PROCEDURE — 87040 BLOOD CULTURE FOR BACTERIA: CPT

## 2019-08-05 PROCEDURE — 84484 ASSAY OF TROPONIN QUANT: CPT

## 2019-08-05 PROCEDURE — 96374 THER/PROPH/DIAG INJ IV PUSH: CPT

## 2019-08-05 RX ADMIN — THIAMINE HYDROCHLORIDE 1 ML: 100 INJECTION, SOLUTION INTRAMUSCULAR; INTRAVENOUS at 17:30

## 2019-08-05 RX ADMIN — THIAMINE HYDROCHLORIDE 1 ML: 100 INJECTION, SOLUTION INTRAMUSCULAR; INTRAVENOUS at 12:27

## 2019-08-05 RX ADMIN — ACETAMINOPHEN 1 MG: 325 TABLET, FILM COATED ORAL at 12:28

## 2019-08-05 RX ADMIN — FOLIC ACID SCH MLS/HR: 5 INJECTION, SOLUTION INTRAMUSCULAR; INTRAVENOUS; SUBCUTANEOUS at 16:45

## 2019-08-05 RX ADMIN — LIDOCAINE HYDROCHLORIDE 1 MLS/HR: 10 INJECTION, SOLUTION EPIDURAL; INFILTRATION; INTRACAUDAL; PERINEURAL at 22:15

## 2019-08-05 RX ADMIN — CEFTRIAXONE 1 MLS/HR: 1 INJECTION, SOLUTION INTRAVENOUS at 12:30

## 2019-08-05 RX ADMIN — THIAMINE HYDROCHLORIDE 1 MLS/HR: 100 INJECTION, SOLUTION INTRAMUSCULAR; INTRAVENOUS at 16:45

## 2019-08-05 RX ADMIN — THIAMINE HYDROCHLORIDE 1 MLS/HR: 100 INJECTION, SOLUTION INTRAMUSCULAR; INTRAVENOUS at 15:30

## 2019-08-05 NOTE — HP
Date/Time of Note


Date/Time of Note


DATE: 8/5/19 


TIME: 18:17





Assessment/Plan


VTE Prophylaxis


SCD contraindicated:  low risk/ambulating


Pharmacological prophylaxis:  NA/contraindicated


Pharm contraindication:  low risk/ambulating





Lines/Catheters


IV Catheter Type (from Nrs):  Saline Lock





Assessment/Plan


Hospital Course





Chief complaint


Fever





History present illness


34-year-old female who presents from home without any symptoms except for the 


last 24 hours, has had fever, weakness, and nausea.  No known aggravating or 


relieving factors.  May have some right ear pain.  May have some right back 


pain.  Denies any injury headaches loss of speech or vision.  No neck stiffness.


 ambulation and appetite have been fine.  Denies any mihir abdominal pain 


dysuria.  No ill contacts, ill foods, or recent travel.  No recent surgeries





ER: Fever low blood pressure





Past medical history


Overweight





Past surgical history


None





Social history


No tobacco alcohol unemployed





FH


No history of early coronary disease cancer stroke positive diabetes





Home medications


None





Review of systems


Neuro: No headache no loss speech/ vision


Cardia vascular: No chest pain no dyspnea no edema


Lungs: No cough no wheezing. + fever


Abdomen: No pain positive nausea no vomiting no diarrhea


Genitourinary: No dysuria hematuria. positive fever


Musculoskeletal: No gait dysfunction rash itching no edema


Constitutional: Fever chills? No Reiger


Psychiatry: Patient has a stable mood without any active agitation anxiety 


depression


Hematologic; no hematochezia melena hematuria


Endocrine: No previous diabetes thyroid dysfunction or dyslipidemia








Physical exam


No pallor adenopathy or sinus tenderness ears fairly clean minimal wax


Regular no murmur gallop


Clear


Bs + nt nd no r/r/g; overweight. no appreciated cvat


No edema/Homans





Assessment and plan


1.  Suspected severe sepsis with hypovolemia, stable cont fluids empiric 


antibiotics


2.  Acute cystitis/pyelonephritis?  Follow-up on cultures


3.  Anemia


4.  Obesity disorder


Result Diagram:  


8/5/19 1217                                                                     


          8/5/19 1217





Results 24hrs





Laboratory Tests


Test
               8/5/19
12:17  8/5/19
12:20        8/5/19
13:23  8/5/19
13:26


White Blood Count        11.4  H


Red Blood Count          4.52  #


Hemoglobin               13.5  #


Hematocrit               39.7  #


Mean Corpuscular          87.8


Volume


Mean Corpuscular          29.9


Hemoglobin


Mean Corpuscular         34.0  
  
             
                   



Hemoglobin
Concent


Red Cell                  12.2


Distribution Width


Platelet Count             238


Mean Platelet             10.2


Volume


Immature                0.600  H


Granulocytes %


Neutrophils %            80.9  H


Lymphocytes %            14.4  L


Monocytes %                2.5


Eosinophils %              1.2


Basophils %                0.4


Nucleated Red              0.0


Blood Cells %


Immature                0.070  H


Granulocytes #


Neutrophils #             9.2  H


Lymphocytes #              1.6


Monocytes #                0.3


Eosinophils #              0.1


Basophils #                0.1


Nucleated Red              0.0


Blood Cells #


Prothrombin Time          12.8


Prothrombin Time           1.0


Ratio


INR International        0.95  
  
             
                   



Normalized
Ratio


Activated                26.1  
  
             
                   



Partial
Thrombopla


st Time


Sodium Level               140


Potassium Level            3.5


Chloride Level             106


Carbon Dioxide             20  L


Level


Anion Gap                  14  H


Blood Urea                  13


Nitrogen


Creatinine                0.67


Est Glomerular      > 60  
       
             
                   



Filtrat


Rate
mL/min


Glucose Level              145


Calcium Level              9.6


Total Bilirubin            0.8


Direct Bilirubin          0.00


Indirect Bilirubin         0.8


Aspartate Amino           64  H
  
             
                   



Transf
(AST/SGOT)


Alanine                    50  
  
             
                   



Aminotransferase
(


ALT/SGPT)


Alkaline                  124  H


Phosphatase


Troponin I          < 0.012


Total Protein             8.4  H


Albumin                    4.5


Globulin                 3.90  H


Albumin/Globulin          1.15


Ratio


POC Venous Lactate                     2.6  *H


Urine Color                                     YELLOW


Urine Clarity
      
             
             SLIGHTLY
CLOUDY  A  



Urine pH                                                     6.0


Urine Specific                                             1.012


Gravity


Urine Ketones                                   NEGATIVE


Urine Nitrite                                   NEGATIVE


Urine Bilirubin                                 NEGATIVE


Urine Urobilinogen                              NEGATIVE


Urine Leukocyte                                              1+  H


Esterase


Urine Microscopic                                              4


RBC


Urine Microscopic                                            58  H


WBC


Urine Squamous      
             
             FEW  
              



Epithelial
Cells


Urine Bacteria                                  FEW  A


Urine Mucus                                     FEW  A


Urine Hemoglobin                                             1+  H


Urine Glucose                                   NEGATIVE


Urine Total                                                  1+  H


Protein


Bedside Urine pH                                                           6.5


(LAB)


Bedside Urine                                                              1+  H


Protein (LAB)


Bedside Urine                                                       Negative


Glucose (UA)


Bedside Urine                                                       Negative


Ketones (LAB)


Bedside Urine                                                              1+  H


Blood


Bedside Urine                                                       Positive  H


Nitrite (LAB)


Bedside Urine       
             
             
                         1+  H



Leukocyte
Esterase


(L


Test
               8/5/19
13:28  8/5/19
15:10        8/5/19
17:50  



POC Beta HCG,       NEGATIVE


Qualitative


Lactic Acid Level                        1.1                 1.0








HPI/ROS


Admit Date/Time


Admit Date/Time








PMH/Family/Social


Past Medical History


Medications





Current Medications


Sodium Chloride 1,000 ml @  125 mls/hr Q8H IV  Last administered on 8/5/19at 16:


45; Admin Dose 125 MLS/HR;  Start 8/5/19 at 16:35


IV Flush (NS 3 ml) 3 ml PER PROTOCOL IV ;  Start 8/5/19 at 17:00


Ondansetron HCl (Zofran Inj) 4 mg Q6H  PRN IV NAUSEA/VOMITING;  Start 8/5/19 at 


17:00


Acetaminophen (Tylenol Tab) 650 mg Q6H  PRN PO .PAIN 1-3 OR TEMP;  Start 8/5/19 


at 17:00


Acetaminophen/ Hydrocodone Bitart (Norco (5/325)) 1 tab Q6H  PRN PO .MOD PAIN 4-


6;  Start 8/5/19 at 17:00


Morphine Sulfate (morphine) 2 mg Q4H  PRN IV .SEVERE PAIN 7-10;  Start 8/5/19 at


17:00


Docusate Sodium (Colace) 100 mg Q12H  PRN PO .CONSTIPATION;  Start 8/5/19 at 


17:00


Magnesium Hydroxide (Milk Of Mag) 30 ml DAILY  PRN PO .CONSTIPATION;  Start 


8/5/19 at 17:00


Bisacodyl (Dulcolax) 5 mg DAILY  PRN PO .CONSTIPATION;  Start 8/5/19 at 17:00


Bisacodyl (Dulcolax Supp) 10 mg DAILY  PRN [place] .CONSTIPATION;  Start 8/5/19 at 


17:00


Ceftriaxone Sodium 50 ml @  100 mls/hr Q24H IVPB ;  Start 8/6/19 at 12:30


Coded Allergies:  


     No Known Drug Allergies (Verified  Allergy, Unknown, 8/5/19)





Social History


Smoking Status:  Never smoker





Exam/Review of Systems


Vital Signs


Vitals





Vital Signs


  Date      Temp  Pulse  Resp  B/P (MAP)   Pulse Ox  O2          O2 Flow    FiO2


Time                                                 Delivery    Rate


    8/5/19  98.1


     16:01


    8/5/19           94    15  94/63 (73)        96  Room Air


     15:00














MANDY MARISCAL MD          Aug 5, 2019 18:24

## 2019-08-05 NOTE — ERD
ER Documentation


Chief Complaint


Chief Complaint





FEVER





HPI


The patient is a 34-year-old female, presenting to the ER because of fever that 


began this morning, vomited twice of mostly mucus, denies chills, nasal 


congestion, cough, sore throat, neck pain, chest pain, dyspnea, abdominal pain, 


vomiting, dysuria.  She does not smoke nor drink or use illicit drug





Past medical/surgical history: None





ROS


All systems reviewed and are negative except as per history of present illness.





Medications


Home Meds


Discontinued Reported Medications


Multivit/Min/Fol Ac/Iron/Pren* (Prenatal S*) 1 Tab Tab, 1 TAB PO DAILY, TAB


   17





Allergies


Allergies:  


Coded Allergies:  


     No Known Drug Allergies (Verified  Allergy, Unknown, 19)





Physical Exam


Vitals





Vital Signs


  Date      Temp   Pulse  Resp  B/P (MAP)   Pulse Ox  O2         O2 Flow    FiO2


Time                                                  Delivery   Rate


    19  100.0    101    18  92/65 (74)        98  Room Air


     13:24


    19  101.7


     12:28


    19  101.7    158    24  88/50 (63)        95


     12:00





Physical Exam


 Const:      No acute distress.


 Head:        Atraumatic.


 Eyes:       Normal Conjunctiva.


 ENT:         Normal External Ears, Nose and Mouth.  Bilateral tympanic 


membranes/oropharynx are within normal limits


 Neck:        Full range of motion.  No meningismus.


 Resp:         Clear to auscultation bilaterally.


 Cardio:       Regular tachycardic


 Abd:         Soft,  non distended, normal bowel sounds, non tender.


 Skin:         No petechiae or rashes.


 Back:        No midline or flank tenderness.


 Ext:          No cyanosis, or edema.


 Neur:        Awake and alert. No focal deficit


 Psych:        Normal Mood and Affect.


Result Diagram:  


19 1217                                                                     


          19 1217





Results 24 hrs





Laboratory Tests


Test
                     19
12:17  19
12:20  19
13:26  19
13:28


White Blood Count        11.4 10^3/ul


Red Blood Count          4.52 10^6/ul


Hemoglobin                  13.5 g/dl


Hematocrit                     39.7 %


Mean Corpuscular              87.8 fl


Volume


Mean Corpuscular              29.9 pg


Hemoglobin


Mean Corpuscular           34.0 g/dl 
  
             
             



Hemoglobin
Concent


Red Cell Distribution          12.2 %


Width


Platelet Count            238 10^3/UL


Mean Platelet Volume          10.2 fl


Immature Granulocytes         0.600 %


%


Neutrophils %                  80.9 %


Lymphocytes %                  14.4 %


Monocytes %                     2.5 %


Eosinophils %                   1.2 %


Basophils %                     0.4 %


Nucleated Red Blood       0.0 /100WBC


Cells %


Immature Granulocytes   0.070 10^3/ul


#


Neutrophils #             9.2 10^3/ul


Lymphocytes #             1.6 10^3/ul


Monocytes #               0.3 10^3/ul


Eosinophils #             0.1 10^3/ul


Basophils #               0.1 10^3/ul


Nucleated Red Blood       0.0 10^3/ul


Cells #


Prothrombin Time             12.8 Sec


Prothrombin Time Ratio            1.0


INR International               0.95 
  
             
             



Normalized
Ratio


Activated                   26.1 Sec 
  
             
             



Partial
Thromboplast


Time


Sodium Level               140 mmol/L


Potassium Level            3.5 mmol/L


Chloride Level             106 mmol/L


Carbon Dioxide Level        20 mmol/L


Anion Gap                          14


Blood Urea Nitrogen          13 mg/dl


Creatinine                 0.67 mg/dl


Est Glomerular Filtrat  > 60 mL/min 
   
             
             



Rate
mL/min


Glucose Level               145 mg/dl


Calcium Level               9.6 mg/dl


Total Bilirubin             0.8 mg/dl


Direct Bilirubin           0.00 mg/dl


Indirect Bilirubin          0.8 mg/dl


Aspartate Amino              64 IU/L 
  
             
             



Transf
(AST/SGOT)


Alanine                      50 IU/L 
  
             
             



Aminotransferase
(ALT/


SGPT)


Alkaline Phosphatase         124 IU/L


Troponin I              < 0.012 ng/ml


Total Protein                8.4 g/dl


Albumin                      4.5 g/dl


Globulin                    3.90 g/dl


Albumin/Globulin Ratio           1.15


POC Venous Lactate                       2.6 mmol/L


Bedside Urine pH (LAB)                                        6.5


Bedside Urine Protein                                          1+


(LAB)


Bedside Urine Glucose                                 Negative


(UA)


Bedside Urine Ketones                                 Negative


(LAB)


Bedside Urine Blood                                            1+


Bedside Urine Nitrite                                 Positive


(LAB)


Bedside Urine           
               
                     1+ 
  



Leukocyte
Esterase (L


POC Beta HCG,                                                       NEGATIVE


Qualitative





Current Medications


 Medications
   Dose
          Sig/Stella
       Start Time
   Status  Last


 (Trade)       Ordered        Route
 PRN     Stop Time              Admin
Dose


                              Reason                                Admin


 Sodium         1,760 ml       BOLUS OVER 2   19        DC            19


Chloride
                     HOURS STAT
    12:09
 19                12:27



(NS)                          IV*
           12:10


                650 mg         ONCE  ONCE
    19        DC            19


Acetaminophen                 PO
            12:30
 19                12:28




  (Tylenol                                  12:31


Tab)


 Ceftriaxone    50 ml @ 
      ONCE  ONCE
    19        DC            19


Sodium         100 mls/hr     IVPB
          12:30
 19                12:30



                                             12:59








Procedures/MDM


                          [place] [address]


                        Radiology Main Line: [phone]





                            DIAGNOSTIC IMAGING REPORT





 Patient: ILDA HICKEY   : 1985   Age: 34  Sex: F                        


       MR #:    [id number]   Acct #:   [id number]    DOS: 19 1209


Ordering MD: NOVA DAVILA MD   Location:  E/R   Room/Bed:                    


                       


                                        


PROCEDURE:   XR Chest. 


 


CLINICAL INDICATION:   Sepsis


 


TECHNIQUE:   AP view of the chest was obtained. 


 


COMPARISON:   None. 


 


FINDINGS:


The cardiomediastinal silhouette is within normal limits.   The lungs appear 


clear.  No pleural effusion or pneumothorax is identified.  


 


 


IMPRESSION:


No active cardiopulmonary disease identified.


 


RPTAT: VV


_____________________________________________ 


.Chente Chambers MD, MD           Date    Time 


Electronically viewed and signed by .Chente Chambers MD, MD on 2019 12:28 


 


D:  2019 12:28  T:  2019 12:28


.O/





CC: NOVA DAVILA MD





[phone]





EKG:                           Read by emergency physician


Rate/Rhythm:         Sinus tachycardia 107 beats/min


QRS, ST, T-waves:    No ST elevation, no T inversion, PSVC


Impression:      Abnormal          EKG





MEDICAL MAKING DECISION: The patient is presenting with acute severe sepsis, 


acute cystitis





She was treated with Tylenol 650 mg p.o. for fever, normosaline 30 mm/kg IV and 


Rocephin IV for acute severe sepsis due to acute cystitis with good response





The differential diagnoses considered include but are not limited to cystitis, 


pyelonephritis, pneumonia





MDM:





Patient's infectious symptoms have not stabilized and the patient is at risk of 


rapid decompensation.  The patient will be admitted for careful hydration, an


tibiotic therapy, and infectious source control.








SEVERE SEPSIS CRITERIA:





Infectious source: uti





End organ damage indicated by: 


[Lactate > 2.0 mmol/L


 Hypotension (SBP < 90 or >40 mmHG drop or MAP < 65)








SEPSIS MANAGEMENT





 Time of recognition of severe sepsis: 12:25p








3 HOUR BUNDLE


Blood cultures x 2 before broad-spectrum antibiotics: [Yes]


30 ml/kg NS bolus [Completed]


Initial lactate [***]2.6


Repeat lactate  Pending








SEPTIC SHOCK ASSESSMENT:





[No] lactic acid > 4.0 


[No] Persistent hypotension (SBP < 90 or 40 mmHg drop, MAP < 65) despite 30 


mL/kg IV fluid bolus 








CRITICAL CARE





Critical care time [35] minutes





Emergent fluid management while maintaining close respiratory support.  


Provision of immediate and broad-spectrum antibiotic therapy.  Simultaneous 


assessment for possible sources in order to direct targeted therapy.  


Consideration for invasive and chemical support to prevent cardiopulmonary 


collapse.  Critical care time is independent of procedures performed.





Departure


Diagnosis:  


   Primary Impression:  


   Severe sepsis


   Additional Impressions:  


   UTI (urinary tract infection)


   Dehydration


Condition:  Stable


Comments


I discussed the findings with the patient. I notified the patient with Dr. Reynolds at 1:45p  via [employer], who was made aware of the lab, the 


treatment, the patient condition. The patient is admitted to [facility]





Disclaimer: Inadvertent spelling and grammatical errors are likely due to 


EHR/dictation software use and do not reflect on the overall quality of patient 


care. Also, please note that the electronic time recorded on this note does not 


necessarily reflect the actual time of the patient encounter.











NOVA DAVILA MD               Aug 5, 2019 12:08

## 2019-08-06 VITALS — SYSTOLIC BLOOD PRESSURE: 97 MMHG | RESPIRATION RATE: 18 BRPM | HEART RATE: 72 BPM | DIASTOLIC BLOOD PRESSURE: 55 MMHG

## 2019-08-06 VITALS — SYSTOLIC BLOOD PRESSURE: 108 MMHG | HEART RATE: 97 BPM | DIASTOLIC BLOOD PRESSURE: 60 MMHG

## 2019-08-06 VITALS — SYSTOLIC BLOOD PRESSURE: 98 MMHG | HEART RATE: 90 BPM | DIASTOLIC BLOOD PRESSURE: 56 MMHG | RESPIRATION RATE: 18 BRPM

## 2019-08-06 VITALS — DIASTOLIC BLOOD PRESSURE: 58 MMHG | HEART RATE: 84 BPM | SYSTOLIC BLOOD PRESSURE: 92 MMHG | RESPIRATION RATE: 18 BRPM

## 2019-08-06 VITALS — HEART RATE: 92 BPM | SYSTOLIC BLOOD PRESSURE: 89 MMHG | DIASTOLIC BLOOD PRESSURE: 53 MMHG | RESPIRATION RATE: 18 BRPM

## 2019-08-06 VITALS — SYSTOLIC BLOOD PRESSURE: 92 MMHG | RESPIRATION RATE: 18 BRPM | HEART RATE: 84 BPM | DIASTOLIC BLOOD PRESSURE: 58 MMHG

## 2019-08-06 VITALS — RESPIRATION RATE: 19 BRPM | SYSTOLIC BLOOD PRESSURE: 92 MMHG | HEART RATE: 82 BPM | DIASTOLIC BLOOD PRESSURE: 55 MMHG

## 2019-08-06 LAB
ADD MAN DIFF?: NO
ALANINE AMINOTRANSFERASE: 60 IU/L (ref 13–69)
ALBUMIN/GLOBULIN RATIO: 1.06
ALBUMIN: 3.3 G/DL (ref 3.3–4.9)
ALKALINE PHOSPHATASE: 78 IU/L (ref 42–121)
ANION GAP: 7 (ref 5–13)
ASPARTATE AMINO TRANSFERASE: 45 IU/L (ref 15–46)
BASOPHIL #: 0.1 10^3/UL (ref 0–0.1)
BASOPHILS %: 0.4 % (ref 0–2)
BILIRUBIN,DIRECT: 0 MG/DL (ref 0–0.2)
BILIRUBIN,TOTAL: 0.5 MG/DL (ref 0.2–1.3)
BLOOD UREA NITROGEN: 4 MG/DL (ref 7–20)
CALCIUM: 8.4 MG/DL (ref 8.4–10.2)
CARBON DIOXIDE: 23 MMOL/L (ref 21–31)
CHLORIDE: 113 MMOL/L (ref 97–110)
CREATININE: 0.4 MG/DL (ref 0.44–1)
EOSINOPHILS #: 0.2 10^3/UL (ref 0–0.5)
EOSINOPHILS %: 1.4 % (ref 0–7)
GLOBULIN: 3.1 G/DL (ref 1.3–3.2)
GLUCOSE: 101 MG/DL (ref 70–220)
HEMATOCRIT: 33.4 % (ref 37–47)
HEMOGLOBIN A1C: 5.1 % (ref 0–5.9)
HEMOGLOBIN: 10.9 G/DL (ref 12–16)
IMMATURE GRANS #M: 0.05 10^3/UL (ref 0–0.03)
IMMATURE GRANS % (M): 0.4 % (ref 0–0.43)
LYMPHOCYTES #: 2.5 10^3/UL (ref 0.8–2.9)
LYMPHOCYTES %: 21.9 % (ref 15–51)
MAGNESIUM: 2 MG/DL (ref 1.7–2.5)
MEAN CORPUSCULAR HEMOGLOBIN: 29.5 PG (ref 29–33)
MEAN CORPUSCULAR HGB CONC: 32.6 G/DL (ref 32–37)
MEAN CORPUSCULAR VOLUME: 90.3 FL (ref 82–101)
MEAN PLATELET VOLUME: 10.7 FL (ref 7.4–10.4)
MONOCYTE #: 1.1 10^3/UL (ref 0.3–0.9)
MONOCYTES %: 9.2 % (ref 0–11)
NEUTROPHIL #: 7.7 10^3/UL (ref 1.6–7.5)
NEUTROPHILS %: 66.7 % (ref 39–77)
NUCLEATED RED BLOOD CELLS #: 0 10^3/UL (ref 0–0)
NUCLEATED RED BLOOD CELLS%: 0 /100WBC (ref 0–0)
PLATELET COUNT: 209 10^3/UL (ref 140–415)
POTASSIUM: 3.7 MMOL/L (ref 3.5–5.1)
RED BLOOD COUNT: 3.7 10^6/UL (ref 4.2–5.4)
RED CELL DISTRIBUTION WIDTH: 12.6 % (ref 11.5–14.5)
SODIUM: 143 MMOL/L (ref 135–144)
THYROID STIMULATING HORMONE: 0.73 MIU/L (ref 0.47–4.68)
TOTAL PROTEIN: 6.4 G/DL (ref 6.1–8.1)
WHITE BLOOD COUNT: 11.6 10^3/UL (ref 4.8–10.8)

## 2019-08-06 RX ADMIN — FOLIC ACID SCH MLS/HR: 5 INJECTION, SOLUTION INTRAMUSCULAR; INTRAVENOUS; SUBCUTANEOUS at 00:57

## 2019-08-06 RX ADMIN — THIAMINE HYDROCHLORIDE 1 MLS/HR: 100 INJECTION, SOLUTION INTRAMUSCULAR; INTRAVENOUS at 00:57

## 2019-08-06 RX ADMIN — THIAMINE HYDROCHLORIDE 1 MLS/HR: 100 INJECTION, SOLUTION INTRAMUSCULAR; INTRAVENOUS at 12:42

## 2019-08-06 RX ADMIN — LIDOCAINE HYDROCHLORIDE 1 MLS/HR: 10 INJECTION, SOLUTION EPIDURAL; INFILTRATION; INTRACAUDAL; PERINEURAL at 16:31

## 2019-08-06 RX ADMIN — CEFTRIAXONE 1 MLS/HR: 2 INJECTION, SOLUTION INTRAVENOUS at 12:38

## 2019-08-06 RX ADMIN — FOLIC ACID SCH MLS/HR: 5 INJECTION, SOLUTION INTRAMUSCULAR; INTRAVENOUS; SUBCUTANEOUS at 12:42

## 2019-08-06 NOTE — PDOCDIS
Discharge Instructions


CONDITION


                [id number]
Patient Condition:  [id number]
Stable








HOME CARE INSTRUCTIONS:


                [id number]
Diet Instructions:  [id number]
Regular








ACTIVITY:


     [id number]
Activity Restrictions:  [id number]
Slowly Increase Activity


                                        Avoid heavy lifting








FOLLOW UP/APPOINTMENTS


Follow-up Plan


appt Primary 2-4days. and ask PCP to call Medical records  for 


discharge summary











MANDY MARISCAL MD          Aug 6, 2019 17:39

## 2019-08-06 NOTE — PN
Date/Time of Note


Date/Time of Note


DATE: 8/6/19 


TIME: 16:16





Assessment/Plan


VTE Prophylaxis


Risk score (from N[place])>0 risk:  0


[place] [facility]  [place]


SCD contraindicated:  low risk/ambulating


Pharmacological prophylaxis:  NA/contraindicated


Pharm contraindication:  low risk/ambulating





Lines/Catheters


IV Catheter Type (from [facility]):  Peripheral IV





Assessment/Plan


Hospital Course





Assessment and plan


1.  Suspected severe sepsis with hypovolemia, stable cont fluids empiric 


antibiotics


2.  Acute cystitis/pyelonephritis?  Follow-up on cultures


3.  Anemia


4.  Obesity disorder





S: No distress wants to go home.  no dizziness dysuria





O: Systolic in the low 90s





Pe


No pallor or sinus tenderness 


Regular no murmur gallop


Clear


Bs + nt nd no r/r/g; overweight. no appreciated cvat


No edema/Homans


No neck rigidity ot Kernig's/ Brudzinski sign


Result Diagram:  


8/6/19 0517                                                                     


          8/6/19 0517





Results 24hrs





Laboratory Tests


 Test
                                8/5/19
17:49  8/5/19
17:50  8/6/19
05:17


 Procalcitonin                            16.87  H


 Lactic Acid Level                                         1.0


 White Blood Count                                                     11.6  H


 Red Blood Count                                                       3.70  L


 Hemoglobin                                                            10.9  L


 Hematocrit                                                            33.4  L


 Mean Corpuscular Volume                                                90.3


 Mean Corpuscular Hemoglobin                                            29.5


 Mean Corpuscular Hemoglobin
Concent  
             
                  32.6  



 Red Cell Distribution Width                                            12.6


 Platelet Count                                                          209


 Mean Platelet Volume                                                  10.7  H


 Immature Granulocytes %                                               0.400


 Neutrophils %                                                          66.7


 Lymphocytes %                                                          21.9


 Monocytes %                                                             9.2


 Eosinophils %                                                           1.4


 Basophils %                                                             0.4


 Nucleated Red Blood Cells %                                             0.0


 Immature Granulocytes #                                              0.050  H


 Neutrophils #                                                          7.7  H


 Lymphocytes #                                                           2.5


 Monocytes #                                                            1.1  H


 Eosinophils #                                                           0.2


 Basophils #                                                             0.1


 Nucleated Red Blood Cells #                                             0.0


 Sodium Level                                                            143


 Potassium Level                                                         3.7


 Chloride Level                                                         113  H


 Carbon Dioxide Level                                                     23


 Anion Gap                                                                 7


 Blood Urea Nitrogen                                                     4  #L


 Creatinine                                                            0.40  L


 Est Glomerular Filtrat Rate
mL/min   
             
             > 60  



 Glucose Level                                                          101  #


 Hemoglobin A1c                                                          5.1


 Calcium Level                                                           8.4


 Magnesium Level                                                         2.0


 Total Bilirubin                                                         0.5


 Direct Bilirubin                                                       0.00


 Indirect Bilirubin                                                      0.5


 Aspartate Amino Transf
(AST/SGOT)    
             
                    45  



 Alanine Aminotransferase
(ALT/SGPT)  
             
                    60  



 Alkaline Phosphatase                                                     78


 Total Protein                                                          6.4  #


 Albumin                                                                3.3  #


 Globulin                                                               3.10


 Albumin/Globulin Ratio                                                 1.06


 Thyroid Stimulating Hormone
(TSH)    
             
                 0.728  









Exam/Review of Systems


Exam


Vitals





Vital Signs


  Date      Temp  Pulse  Resp  B/P (MAP)   Pulse Ox  O2          O2 Flow    FiO2


Time                                                 Delivery    Rate


    8/6/19  98.0     90    18  98/56 (70)        98


     15:53


    8/6/19                                           Room Air


     03:37








Intake and Output





8/5/19 8/5/19 8/6/19





1515:00


23:00


07:00





IntakeIntake Total


240 ml





BalanceBalance


240 ml














Results


Results 24hrs





Laboratory Tests


 Test
                                8/5/19
17:49  8/5/19
17:50  8/6/19
05:17


 Procalcitonin                            16.87  H


 Lactic Acid Level                                         1.0


 White Blood Count                                                     11.6  H


 Red Blood Count                                                       3.70  L


 Hemoglobin                                                            10.9  L


 Hematocrit                                                            33.4  L


 Mean Corpuscular Volume                                                90.3


 Mean Corpuscular Hemoglobin                                            29.5


 Mean Corpuscular Hemoglobin
Concent  
             
                  32.6  



 Red Cell Distribution Width                                            12.6


 Platelet Count                                                          209


 Mean Platelet Volume                                                  10.7  H


 Immature Granulocytes %                                               0.400


 Neutrophils %                                                          66.7


 Lymphocytes %                                                          21.9


 Monocytes %                                                             9.2


 Eosinophils %                                                           1.4


 Basophils %                                                             0.4


 Nucleated Red Blood Cells %                                             0.0


 Immature Granulocytes #                                              0.050  H


 Neutrophils #                                                          7.7  H


 Lymphocytes #                                                           2.5


 Monocytes #                                                            1.1  H


 Eosinophils #                                                           0.2


 Basophils #                                                             0.1


 Nucleated Red Blood Cells #                                             0.0


 Sodium Level                                                            143


 Potassium Level                                                         3.7


 Chloride Level                                                         113  H


 Carbon Dioxide Level                                                     23


 Anion Gap                                                                 7


 Blood Urea Nitrogen                                                     4  #L


 Creatinine                                                            0.40  L


 Est Glomerular Filtrat Rate
mL/min   
             
             > 60  



 Glucose Level                                                          101  #


 Hemoglobin A1c                                                          5.1


 Calcium Level                                                           8.4


 Magnesium Level                                                         2.0


 Total Bilirubin                                                         0.5


 Direct Bilirubin                                                       0.00


 Indirect Bilirubin                                                      0.5


 Aspartate Amino Transf
(AST/SGOT)    
             
                    45  



 Alanine Aminotransferase
(ALT/SGPT)  
             
                    60  



 Alkaline Phosphatase                                                     78


 Total Protein                                                          6.4  #


 Albumin                                                                3.3  #


 Globulin                                                               3.10


 Albumin/Globulin Ratio                                                 1.06


 Thyroid Stimulating Hormone
(TSH)    
             
                 0.728  









Medications


Medication





Current Medications


Sodium Chloride 1,000 ml @  100 mls/hr Q10H IV  Last administered on 8/6/19at 


12:42; Admin Dose 100 MLS/HR;  Start 8/5/19 at 16:35


IV Flush (NS 3 ml) 3 ml PER PROTOCOL IV ;  Start 8/5/19 at 17:00


Ondansetron HCl (Zofran Inj) 4 mg Q6H  PRN IV NAUSEA/VOMITING;  Start 8/5/19 at 


17:00


Acetaminophen (Tylenol Tab) 650 mg Q6H  PRN PO .PAIN 1-3 OR TEMP;  Start 8/5/19 


at 17:00


Acetaminophen/ Hydrocodone Bitart (Norco (5/325)) 1 tab Q6H  PRN PO .MOD PAIN 4-


6;  Start 8/5/19 at 17:00


Morphine Sulfate (morphine) 2 mg Q4H  PRN IV .SEVERE PAIN 7-10;  Start 8/5/19 at


17:00


Docusate Sodium (Colace) 100 mg Q12H  PRN PO .CONSTIPATION;  Start 8/5/19 at 


17:00


Magnesium Hydroxide (Milk Of Mag) 30 ml DAILY  PRN PO .CONSTIPATION;  Start 


8/5/19 at 17:00


Bisacodyl (Dulcolax) 5 mg DAILY  PRN PO .CONSTIPATION;  Start 8/5/19 at 17:00


Bisacodyl (Dulcolax Supp) 10 mg DAILY  PRN NE .CONSTIPATION;  Start 8/5/19 at 


17:00


Ceftriaxone Sodium 50 ml @  100 mls/hr Q24H IVPB  Last administered on 8/6/19at 


12:38; Admin Dose 100 MLS/HR;  Start 8/6/19 at 12:30











MANDY MARISCAL MD          Aug 6, 2019 16:17

## 2019-08-06 NOTE — DS
Date/Time of Note


Date/Time of Note


DATE: 8/6/19 


TIME: 17:40





Discharge Summary


Admission/Discharge Info


Admit Date/Time


Aug 5, 2019 at 21:14


Discharge Date/Time





Patient Condition:  Stable


Procedures


cxr- neg


Hx of Present Illness





admitted w severe sepsis.


Hospital Course





H Course


1.  Severe sepsis with hypovolemia, stable dc home on empiric cipro


2.  Acute cystitis/pyelonephritis?  Follow-up on cultures 


3.  Anemia


4.  Obesity disorder





S: No distress wants to go home.  no dizziness dysuria





O: Systolic in the low 90s; now improved & orthostatics are negative





Pe


No pallor or sinus tenderness 


Regular no murmur gallop


Clear


Bs + nt nd no r/r/g; overweight. no appreciated cvat


No edema/Homans


No neck rigidity ot Dayami's/ Brudzinski sign


Home Meds


Active Scripts


Ciprofloxacin Hcl* (Ciprofloxacin Hcl*) 500 Mg Tablet, 500 MG PO BID@06,18 for 7


Days, #14 TAB


   Prov:MANDY MARISCAL MD         8/6/19


Discontinued Reported Medications


Multivit/Min/Fol Ac/Iron/Pren* (Prenatal S*) 1 Tab Tab, 1 TAB PO DAILY, TAB


   7/8/17


Follow-up Plan


appt Primary 2-4days. and ask PCP to call Medical records  for 


discharge summary


Primary Care Provider


Care Physician No Primary


Time spent on discharge:  < 30 minutes


Pending Labs





Laboratory Tests


Test
                       8/5/19
17:49        8/5/19
17:50        8/6/19
05:17


Procalcitonin
                     16.87  
                   



                       ng/mL
(0.00-0.10)


Lactic Acid Level
   
                                   1.0  



                                            mmol/L
(0.5-2.0)


White Blood Count
   
                    
                                 11.6


                                                              10^3/ul
(4.8-10.8)


Red Blood Count
     
                    
                                 3.70


                                                              10^6/ul
(4.20-5.40


                                                                               )


Hemoglobin
          
                    
                                 10.9


                                                                g/dl
(12.0-16.0)


Hematocrit
          
                    
                   33.4 %
(37.0-47.0)


Mean Corpuscular     
                    
                                 90.3


Volume
                                                          fl
(82.0-101.0)


Mean Corpuscular     
                    
                                 29.5


Hemoglobin
                                                       pg
(29.0-33.0)


Mean Corpuscular     
                    
                                 32.6


Hemoglobin
Concent                                              g/dl
(32.0-37.0)


Red Cell             
                    
                   12.6 %
(11.5-14.5)


Distribution Width



Platelet Count
      
                    
                                  209


                                                               10^3/UL
(140-415)


Mean Platelet        
                    
                   10.7 fl
(7.4-10.4)


Volume



Immature             
                    
                                0.400


Granulocytes %
                                                  %
(0.001-0.429)


Neutrophils %
       
                    
                   66.7 %
(39.0-77.0)


Lymphocytes %
       
                    
                   21.9 %
(15.0-51.0)


Monocytes %
         
                    
                     9.2 %
(0.0-11.0)


Eosinophils %
       
                    
                      1.4 %
(0.0-7.0)


Basophils %
         
                    
                      0.4 %
(0.0-2.0)


Nucleated Red Blood  
                    
                                  0.0


Cells %
                                                       /100WBC
(0.0-0.0)


Immature             
                    
                                0.050


Granulocytes #
                                               10^3/ul
(0.0-0.031


                                                                               )


Neutrophils #
       
                    
                                  7.7


                                                               10^3/ul
(1.6-7.5)


Lymphocytes #
       
                    
                                  2.5


                                                               10^3/ul
(0.8-2.9)


Monocytes #
         
                    
                                  1.1


                                                               10^3/ul
(0.3-0.9)


Eosinophils #
       
                    
                                  0.2


                                                               10^3/ul
(0.0-0.5)


Basophils #
         
                    
                                  0.1


                                                               10^3/ul
(0.0-0.1)


Nucleated Red Blood  
                    
                                  0.0


Cells #
                                                       10^3/ul
(0.0-0.0)


Sodium Level
        
                    
                                  143


                                                                mmol/L
(135-144)


Potassium Level
     
                    
                                  3.7


                                                                mmol/L
(3.5-5.1)


Chloride Level
      
                    
                                  113


                                                                 mmol/L
()


Carbon Dioxide       
                    
                    23 mmol/L
(21-31)


Level



Anion Gap                                                              7 (5-13)


Blood Urea Nitrogen                                              4 mg/dl (7-20)


Creatinine
          
                    
                                 0.40


                                                               mg/dl
(0.44-1.00)


Est Glomerular       
                    
                   > 60 mL/min
(>60)


Filtrat Rate
mL/min


Glucose Level
       
                    
                   101 mg/dl
()


Hemoglobin A1c                                                    5.1 % (0-5.9)


Calcium Level
       
                    
                                  8.4


                                                                mg/dl
(8.4-10.2)


Magnesium Level
     
                    
                                  2.0


                                                                 mg/dl
(1.7-2.5)


Total Bilirubin
     
                    
                                  0.5


                                                                 mg/dl
(0.2-1.3)


Direct Bilirubin
    
                    
                                 0.00


                                                               mg/dl
(0.00-0.20)


Indirect Bilirubin
  
                    
                    0.5 mg/dl
(0-1.1)


Aspartate Amino      
                    
                      45 IU/L
(15-46)


Transf
(AST/SGOT)


Alanine              
                    
                      60 IU/L
(13-69)


Aminotransferase
(A


LT/SGPT)


Alkaline             
                    
                     78 IU/L
()


Phosphatase



Total Protein
       
                    
                   6.4 g/dl
(6.1-8.1)


Albumin
             
                    
                   3.3 g/dl
(3.3-4.9)


Globulin
            
                    
                                 3.10


                                                                  g/dl
(1.3-3.2)


Albumin/Globulin                                                           1.06


Ratio


Thyroid Stimulating  
                    
                                0.728


Hormone
(TSH)                                                 MIU/L
(0.465-4.680


                                                                               )














MANDY MARISCAL MD          Aug 6, 2019 17:42